# Patient Record
Sex: FEMALE | Race: WHITE | NOT HISPANIC OR LATINO | Employment: OTHER | URBAN - METROPOLITAN AREA
[De-identification: names, ages, dates, MRNs, and addresses within clinical notes are randomized per-mention and may not be internally consistent; named-entity substitution may affect disease eponyms.]

---

## 2020-09-22 ENCOUNTER — NEW PATIENT EMERGENCY (OUTPATIENT)
Dept: URBAN - METROPOLITAN AREA CLINIC 43 | Facility: CLINIC | Age: 65
End: 2020-09-22

## 2020-09-22 DIAGNOSIS — H25.11: ICD-10-CM

## 2020-09-22 DIAGNOSIS — Z96.1: ICD-10-CM

## 2020-09-22 DIAGNOSIS — H35.352: ICD-10-CM

## 2020-09-22 PROCEDURE — 99203 OFFICE O/P NEW LOW 30 MIN: CPT

## 2020-09-22 PROCEDURE — 92134 CPTRZ OPH DX IMG PST SGM RTA: CPT

## 2020-09-22 RX ORDER — PREDNISOLONE ACETATE 10 MG/ML: 1 SUSPENSION/ DROPS OPHTHALMIC

## 2020-09-22 RX ORDER — KETOROLAC TROMETHAMINE 4 MG/ML: 1 SOLUTION/ DROPS OPHTHALMIC

## 2020-09-22 ASSESSMENT — VISUAL ACUITY
OD_CC: 20/50-2
OS_CC: 20/60-1

## 2020-09-22 ASSESSMENT — TONOMETRY
OD_IOP_MMHG: 10
OS_IOP_MMHG: 9

## 2020-10-06 ENCOUNTER — APPOINTMENT (RX ONLY)
Dept: URBAN - METROPOLITAN AREA CLINIC 150 | Facility: CLINIC | Age: 65
Setting detail: DERMATOLOGY
End: 2020-10-06

## 2020-10-06 DIAGNOSIS — D22 MELANOCYTIC NEVI: ICD-10-CM

## 2020-10-06 DIAGNOSIS — L82.1 OTHER SEBORRHEIC KERATOSIS: ICD-10-CM

## 2020-10-06 DIAGNOSIS — D18.0 HEMANGIOMA: ICD-10-CM

## 2020-10-06 DIAGNOSIS — Z71.89 OTHER SPECIFIED COUNSELING: ICD-10-CM

## 2020-10-06 PROBLEM — D23.71 OTHER BENIGN NEOPLASM OF SKIN OF RIGHT LOWER LIMB, INCLUDING HIP: Status: ACTIVE | Noted: 2020-10-06

## 2020-10-06 PROBLEM — D22.5 MELANOCYTIC NEVI OF TRUNK: Status: ACTIVE | Noted: 2020-10-06

## 2020-10-06 PROBLEM — D18.01 HEMANGIOMA OF SKIN AND SUBCUTANEOUS TISSUE: Status: ACTIVE | Noted: 2020-10-06

## 2020-10-06 PROCEDURE — 99203 OFFICE O/P NEW LOW 30 MIN: CPT

## 2020-10-06 PROCEDURE — ? COUNSELING

## 2020-10-06 PROCEDURE — ? FULL BODY SKIN EXAM

## 2020-10-06 PROCEDURE — ? ADDITIONAL NOTES

## 2020-10-06 ASSESSMENT — LOCATION SIMPLE DESCRIPTION DERM
LOCATION SIMPLE: LEFT UPPER BACK
LOCATION SIMPLE: ABDOMEN
LOCATION SIMPLE: RIGHT BACK
LOCATION SIMPLE: LEFT LOWER BACK
LOCATION SIMPLE: RIGHT CHEEK
LOCATION SIMPLE: CHEST

## 2020-10-06 ASSESSMENT — LOCATION DETAILED DESCRIPTION DERM
LOCATION DETAILED: LEFT SUPERIOR UPPER BACK
LOCATION DETAILED: EPIGASTRIC SKIN
LOCATION DETAILED: RIGHT SUPERIOR LATERAL UPPER BACK
LOCATION DETAILED: LEFT SUPERIOR MEDIAL MIDBACK
LOCATION DETAILED: RIGHT SUPERIOR LATERAL BUCCAL CHEEK
LOCATION DETAILED: LEFT LATERAL SUPERIOR CHEST

## 2020-10-06 ASSESSMENT — LOCATION ZONE DERM
LOCATION ZONE: TRUNK
LOCATION ZONE: FACE

## 2020-10-23 ENCOUNTER — ESTABLISHED COMPREHENSIVE EXAM (OUTPATIENT)
Dept: URBAN - METROPOLITAN AREA CLINIC 43 | Facility: CLINIC | Age: 65
End: 2020-10-23

## 2020-10-23 DIAGNOSIS — H25.811: ICD-10-CM

## 2020-10-23 DIAGNOSIS — Z96.1: ICD-10-CM

## 2020-10-23 DIAGNOSIS — H26.492: ICD-10-CM

## 2020-10-23 DIAGNOSIS — H35.352: ICD-10-CM

## 2020-10-23 PROCEDURE — 92134 CPTRZ OPH DX IMG PST SGM RTA: CPT

## 2020-10-23 PROCEDURE — 92015 DETERMINE REFRACTIVE STATE: CPT

## 2020-10-23 PROCEDURE — 92014 COMPRE OPH EXAM EST PT 1/>: CPT

## 2020-10-23 ASSESSMENT — VISUAL ACUITY
OD_CC: 20/200
OD_SC: 20/60+2
OS_SC: >J12
OS_SC: 20/60-1
OD_CC: J8
OS_CC: 20/60-1
OS_CC: J8
OD_SC: J2

## 2020-10-23 ASSESSMENT — TONOMETRY
OD_IOP_MMHG: 10
OS_IOP_MMHG: 13

## 2020-10-26 ENCOUNTER — CATARACT CONSULT (OUTPATIENT)
Dept: URBAN - METROPOLITAN AREA CLINIC 43 | Facility: CLINIC | Age: 65
End: 2020-10-26

## 2020-10-26 DIAGNOSIS — H35.352: ICD-10-CM

## 2020-10-26 DIAGNOSIS — H26.492: ICD-10-CM

## 2020-10-26 DIAGNOSIS — Z96.1: ICD-10-CM

## 2020-10-26 DIAGNOSIS — H25.811: ICD-10-CM

## 2020-10-26 PROCEDURE — 92136TC INTERFEROMETRY - TECHNICAL COMPONENT

## 2020-10-26 PROCEDURE — 92025-2 CORNEAL TOPOGRAPHY, PT

## 2020-10-26 PROCEDURE — 92014 COMPRE OPH EXAM EST PT 1/>: CPT

## 2020-10-26 PROCEDURE — 92250 FUNDUS PHOTOGRAPHY W/I&R: CPT

## 2020-10-26 PROCEDURE — 92286 ANT SGM IMG I&R SPECLR MIC: CPT

## 2020-10-26 PROCEDURE — V2799PMN IMPRIMIS PRED-MOXI-NEPAF 5ML

## 2020-10-26 ASSESSMENT — VISUAL ACUITY
OS_SC: <J12
OD_SC: J2
OS_SC: 20/60+1
OD_SC: 20/60-1

## 2020-10-26 ASSESSMENT — TONOMETRY
OD_IOP_MMHG: 12
OS_IOP_MMHG: 12

## 2020-10-29 ENCOUNTER — SURGERY/PROCEDURE (OUTPATIENT)
Dept: URBAN - METROPOLITAN AREA SURGERY 14 | Facility: SURGERY | Age: 65
End: 2020-10-29

## 2020-10-29 DIAGNOSIS — H26.492: ICD-10-CM

## 2020-10-29 PROCEDURE — 66821 AFTER CATARACT LASER SURGERY: CPT

## 2020-11-05 ENCOUNTER — SURGERY/PROCEDURE (OUTPATIENT)
Dept: URBAN - METROPOLITAN AREA CLINIC 43 | Facility: CLINIC | Age: 65
End: 2020-11-05

## 2020-11-05 ENCOUNTER — ESTABLISHED PATIENT (OUTPATIENT)
Dept: URBAN - METROPOLITAN AREA SURGERY 14 | Facility: SURGERY | Age: 65
End: 2020-11-05

## 2020-11-05 DIAGNOSIS — H26.492: ICD-10-CM

## 2020-11-05 DIAGNOSIS — H25.811: ICD-10-CM

## 2020-11-05 DIAGNOSIS — H35.352: ICD-10-CM

## 2020-11-05 DIAGNOSIS — H57.03: ICD-10-CM

## 2020-11-05 DIAGNOSIS — Z96.1: ICD-10-CM

## 2020-11-05 PROCEDURE — 99211HP H&P OFFICE/OUTPATIENT VISIT, EST

## 2020-11-05 PROCEDURE — 66982CV COMPLEX CATARACT WITH IOL, CUSTOM VISION

## 2020-11-05 PROCEDURE — 65772LRI LRI DURING CAT SX

## 2020-11-05 PROCEDURE — 66999LNSR LENSAR LASER FOR CAT SX

## 2020-11-06 ENCOUNTER — CATARACT POST-OP 1-DAY (OUTPATIENT)
Dept: URBAN - METROPOLITAN AREA CLINIC 43 | Facility: CLINIC | Age: 65
End: 2020-11-06

## 2020-11-06 DIAGNOSIS — Z96.1: ICD-10-CM

## 2020-11-06 DIAGNOSIS — Z98.890: ICD-10-CM

## 2020-11-06 PROCEDURE — 99024 POSTOP FOLLOW-UP VISIT: CPT

## 2020-11-06 ASSESSMENT — VISUAL ACUITY
OD_SC: 20/100-2
OS_SC: 20/30-1

## 2020-11-06 ASSESSMENT — TONOMETRY
OS_IOP_MMHG: 9
OD_IOP_MMHG: 9

## 2020-11-16 ENCOUNTER — APPOINTMENT (RX ONLY)
Dept: URBAN - METROPOLITAN AREA CLINIC 150 | Facility: CLINIC | Age: 65
Setting detail: DERMATOLOGY
End: 2020-11-16

## 2020-11-16 DIAGNOSIS — D485 NEOPLASM OF UNCERTAIN BEHAVIOR OF SKIN: ICD-10-CM

## 2020-11-16 DIAGNOSIS — Z71.89 OTHER SPECIFIED COUNSELING: ICD-10-CM

## 2020-11-16 PROBLEM — D48.5 NEOPLASM OF UNCERTAIN BEHAVIOR OF SKIN: Status: ACTIVE | Noted: 2020-11-16

## 2020-11-16 PROCEDURE — 11102 TANGNTL BX SKIN SINGLE LES: CPT

## 2020-11-16 PROCEDURE — ? FULL BODY SKIN EXAM - DECLINED

## 2020-11-16 PROCEDURE — ? COUNSELING

## 2020-11-16 PROCEDURE — 11103 TANGNTL BX SKIN EA SEP/ADDL: CPT

## 2020-11-16 PROCEDURE — ? BIOPSY BY SHAVE METHOD

## 2020-11-16 ASSESSMENT — LOCATION ZONE DERM
LOCATION ZONE: FACE
LOCATION ZONE: LEG

## 2020-11-16 ASSESSMENT — LOCATION DETAILED DESCRIPTION DERM
LOCATION DETAILED: RIGHT LATERAL MANDIBULAR CHEEK
LOCATION DETAILED: RIGHT LATERAL DISTAL PRETIBIAL REGION

## 2020-11-16 ASSESSMENT — LOCATION SIMPLE DESCRIPTION DERM
LOCATION SIMPLE: RIGHT PRETIBIAL REGION
LOCATION SIMPLE: RIGHT CHEEK

## 2020-11-23 ENCOUNTER — POST-OP CATARACT (OUTPATIENT)
Dept: URBAN - METROPOLITAN AREA CLINIC 43 | Facility: CLINIC | Age: 65
End: 2020-11-23

## 2020-11-23 DIAGNOSIS — Z98.890: ICD-10-CM

## 2020-11-23 DIAGNOSIS — Z96.1: ICD-10-CM

## 2020-11-23 PROCEDURE — 99024 POSTOP FOLLOW-UP VISIT: CPT

## 2020-11-23 ASSESSMENT — VISUAL ACUITY
OS_SC: J12
OS_SC: 20/30+2
OD_SC: 20/30-2
OD_SC: J12

## 2020-11-23 ASSESSMENT — TONOMETRY
OS_IOP_MMHG: 9
OD_IOP_MMHG: 8

## 2021-05-27 ENCOUNTER — ESTABLISHED COMPREHENSIVE EXAM (OUTPATIENT)
Dept: URBAN - METROPOLITAN AREA CLINIC 43 | Facility: CLINIC | Age: 66
End: 2021-05-27

## 2021-05-27 DIAGNOSIS — H35.352: ICD-10-CM

## 2021-05-27 DIAGNOSIS — Z96.1: ICD-10-CM

## 2021-05-27 DIAGNOSIS — H35.353: ICD-10-CM

## 2021-05-27 PROCEDURE — 92134 CPTRZ OPH DX IMG PST SGM RTA: CPT

## 2021-05-27 PROCEDURE — 92015 DETERMINE REFRACTIVE STATE: CPT

## 2021-05-27 PROCEDURE — 92014 COMPRE OPH EXAM EST PT 1/>: CPT

## 2021-05-27 RX ORDER — PREDNISOLONE ACETATE 10 MG/ML: 1 SUSPENSION/ DROPS OPHTHALMIC

## 2021-05-27 RX ORDER — KETOROLAC TROMETHAMINE 4 MG/ML: 1 SOLUTION/ DROPS OPHTHALMIC

## 2021-05-27 ASSESSMENT — VISUAL ACUITY
OD_CC: 20/50
OD_SC: <J12
OD_SC: 20/70-2
OS_SC: 20/40-2
OS_CC: 20/40
OS_CC: J3
OD_CC: J6
OS_SC: <J12

## 2021-05-27 ASSESSMENT — TONOMETRY
OD_IOP_MMHG: 10
OS_IOP_MMHG: 10

## 2021-06-21 ENCOUNTER — FOLLOW UP (OUTPATIENT)
Dept: URBAN - METROPOLITAN AREA CLINIC 43 | Facility: CLINIC | Age: 66
End: 2021-06-21

## 2021-06-21 DIAGNOSIS — Z96.1: ICD-10-CM

## 2021-06-21 DIAGNOSIS — H35.353: ICD-10-CM

## 2021-06-21 PROCEDURE — 92012 INTRM OPH EXAM EST PATIENT: CPT

## 2021-06-21 PROCEDURE — 92134 CPTRZ OPH DX IMG PST SGM RTA: CPT

## 2021-06-21 ASSESSMENT — VISUAL ACUITY
OS_CC: 20/30-2
OD_CC: 20/50

## 2021-08-16 ENCOUNTER — FOLLOW UP (OUTPATIENT)
Dept: URBAN - METROPOLITAN AREA CLINIC 43 | Facility: CLINIC | Age: 66
End: 2021-08-16

## 2021-08-16 DIAGNOSIS — H35.353: ICD-10-CM

## 2021-08-16 PROCEDURE — 99213 OFFICE O/P EST LOW 20 MIN: CPT

## 2021-08-16 PROCEDURE — 92134 CPTRZ OPH DX IMG PST SGM RTA: CPT

## 2021-08-16 ASSESSMENT — TONOMETRY
OS_IOP_MMHG: 11
OD_IOP_MMHG: 9

## 2021-08-16 ASSESSMENT — VISUAL ACUITY
OS_CC: 20/30-1
OD_CC: 20/40-2

## 2022-03-29 ENCOUNTER — EMERGENCY VISIT (OUTPATIENT)
Dept: URBAN - METROPOLITAN AREA CLINIC 43 | Facility: CLINIC | Age: 67
End: 2022-03-29

## 2022-03-29 DIAGNOSIS — H20.023: ICD-10-CM

## 2022-03-29 DIAGNOSIS — H35.353: ICD-10-CM

## 2022-03-29 PROCEDURE — 99213 OFFICE O/P EST LOW 20 MIN: CPT

## 2022-03-29 PROCEDURE — 92134 CPTRZ OPH DX IMG PST SGM RTA: CPT

## 2022-03-29 RX ORDER — PREDNISOLONE ACETATE 10 MG/ML: 1 SUSPENSION/ DROPS OPHTHALMIC

## 2022-03-29 ASSESSMENT — VISUAL ACUITY
OD_SC: 20/50+1
OD_SC: J12
OS_SC: 20/70+1
OS_CC: 20/60-1
OS_SC: <J12
OS_CC: J8
OD_CC: 20/40+2
OD_CC: J3

## 2022-03-29 ASSESSMENT — TONOMETRY
OS_IOP_MMHG: 9
OD_IOP_MMHG: 9

## 2022-11-09 NOTE — PROCEDURE: ADDITIONAL NOTES
Additional Notes: Patient consent was obtained to proceed with the visit and recommended plan of care after discussion of all risks and benefits, including the risks of COVID-19 exposure.
Detail Level: Simple
Eloy Burciaga

## 2023-08-02 ENCOUNTER — COMPREHENSIVE EXAM (OUTPATIENT)
Dept: URBAN - METROPOLITAN AREA CLINIC 43 | Facility: CLINIC | Age: 68
End: 2023-08-02

## 2023-08-02 DIAGNOSIS — H26.493: ICD-10-CM

## 2023-08-02 DIAGNOSIS — H20.023: ICD-10-CM

## 2023-08-02 DIAGNOSIS — Z96.1: ICD-10-CM

## 2023-08-02 DIAGNOSIS — H35.353: ICD-10-CM

## 2023-08-02 PROCEDURE — 92015 DETERMINE REFRACTIVE STATE: CPT

## 2023-08-02 PROCEDURE — 92014 COMPRE OPH EXAM EST PT 1/>: CPT

## 2023-08-02 ASSESSMENT — VISUAL ACUITY
OS_SC: 20/50-1
OS_CC: 20/30-2
OD_SC: <J12
OS_SC: <J12
OD_SC: 20/50+2
OD_CC: 20/30+2
OS_CC: J3
OD_CC: J3

## 2023-08-02 ASSESSMENT — TONOMETRY
OD_IOP_MMHG: 11
OS_IOP_MMHG: 12